# Patient Record
Sex: FEMALE | Race: BLACK OR AFRICAN AMERICAN | NOT HISPANIC OR LATINO | Employment: OTHER | URBAN - METROPOLITAN AREA
[De-identification: names, ages, dates, MRNs, and addresses within clinical notes are randomized per-mention and may not be internally consistent; named-entity substitution may affect disease eponyms.]

---

## 2020-10-29 ENCOUNTER — RECORDS - HEALTHEAST (OUTPATIENT)
Dept: LAB | Facility: CLINIC | Age: 80
End: 2020-10-29

## 2020-10-30 LAB
ALBUMIN SERPL-MCNC: 3.6 G/DL (ref 3.5–5)
ALP SERPL-CCNC: 74 U/L (ref 45–120)
ALT SERPL W P-5'-P-CCNC: 11 U/L (ref 0–45)
ANION GAP SERPL CALCULATED.3IONS-SCNC: 10 MMOL/L (ref 5–18)
AST SERPL W P-5'-P-CCNC: 21 U/L (ref 0–40)
BILIRUB SERPL-MCNC: 0.4 MG/DL (ref 0–1)
BUN SERPL-MCNC: 9 MG/DL (ref 8–28)
CALCIUM SERPL-MCNC: 9.7 MG/DL (ref 8.5–10.5)
CHLORIDE BLD-SCNC: 108 MMOL/L (ref 98–107)
CO2 SERPL-SCNC: 24 MMOL/L (ref 22–31)
CREAT SERPL-MCNC: 0.87 MG/DL (ref 0.6–1.1)
ERYTHROCYTE [DISTWIDTH] IN BLOOD BY AUTOMATED COUNT: 14.9 % (ref 11–14.5)
GFR SERPL CREATININE-BSD FRML MDRD: >60 ML/MIN/1.73M2
GLUCOSE BLD-MCNC: 115 MG/DL (ref 70–125)
HCT VFR BLD AUTO: 45.8 % (ref 35–47)
HGB BLD-MCNC: 13.9 G/DL (ref 12–16)
MCH RBC QN AUTO: 26 PG (ref 27–34)
MCHC RBC AUTO-ENTMCNC: 30.3 G/DL (ref 32–36)
MCV RBC AUTO: 86 FL (ref 80–100)
PLATELET # BLD AUTO: 262 THOU/UL (ref 140–440)
PMV BLD AUTO: 13.3 FL (ref 8.5–12.5)
POTASSIUM BLD-SCNC: 4 MMOL/L (ref 3.5–5)
PROT SERPL-MCNC: 7.9 G/DL (ref 6–8)
RBC # BLD AUTO: 5.35 MILL/UL (ref 3.8–5.4)
SODIUM SERPL-SCNC: 142 MMOL/L (ref 136–145)
TSH SERPL DL<=0.005 MIU/L-ACNC: 3.41 UIU/ML (ref 0.3–5)
VIT B12 SERPL-MCNC: 441 PG/ML (ref 213–816)
WBC: 5.7 THOU/UL (ref 4–11)

## 2020-11-02 LAB — 25(OH)D3 SERPL-MCNC: 37.9 NG/ML (ref 30–80)

## 2021-04-05 ENCOUNTER — RECORDS - HEALTHEAST (OUTPATIENT)
Dept: LAB | Facility: CLINIC | Age: 81
End: 2021-04-05

## 2021-04-06 LAB
ANION GAP SERPL CALCULATED.3IONS-SCNC: 8 MMOL/L (ref 5–18)
BUN SERPL-MCNC: 13 MG/DL (ref 8–28)
CALCIUM SERPL-MCNC: 9.4 MG/DL (ref 8.5–10.5)
CHLORIDE BLD-SCNC: 106 MMOL/L (ref 98–107)
CO2 SERPL-SCNC: 26 MMOL/L (ref 22–31)
CREAT SERPL-MCNC: 0.8 MG/DL (ref 0.6–1.1)
GFR SERPL CREATININE-BSD FRML MDRD: >60 ML/MIN/1.73M2
GLUCOSE BLD-MCNC: 64 MG/DL (ref 70–125)
POTASSIUM BLD-SCNC: 3.9 MMOL/L (ref 3.5–5)
SODIUM SERPL-SCNC: 140 MMOL/L (ref 136–145)

## 2021-04-07 LAB — 25(OH)D3 SERPL-MCNC: 43.8 NG/ML (ref 30–80)

## 2021-07-13 ENCOUNTER — LAB REQUISITION (OUTPATIENT)
Dept: LAB | Facility: CLINIC | Age: 81
End: 2021-07-13
Payer: MEDICARE

## 2021-07-13 DIAGNOSIS — M25.469 EFFUSION, UNSPECIFIED KNEE: ICD-10-CM

## 2021-07-14 ENCOUNTER — LAB REQUISITION (OUTPATIENT)
Dept: LAB | Facility: CLINIC | Age: 81
End: 2021-07-14
Payer: MEDICARE

## 2021-07-14 DIAGNOSIS — Z11.1 ENCOUNTER FOR SCREENING FOR RESPIRATORY TUBERCULOSIS: ICD-10-CM

## 2021-07-14 LAB
BASOPHILS # BLD AUTO: 0 10E3/UL (ref 0–0.2)
BASOPHILS NFR BLD AUTO: 0 %
EOSINOPHIL # BLD AUTO: 0.2 10E3/UL (ref 0–0.7)
EOSINOPHIL NFR BLD AUTO: 3 %
ERYTHROCYTE [DISTWIDTH] IN BLOOD BY AUTOMATED COUNT: 14.4 % (ref 10–15)
HCT VFR BLD AUTO: 42.5 % (ref 35–47)
HGB BLD-MCNC: 13.3 G/DL (ref 11.7–15.7)
IMM GRANULOCYTES # BLD: 0 10E3/UL
IMM GRANULOCYTES NFR BLD: 0 %
LYMPHOCYTES # BLD AUTO: 2.1 10E3/UL (ref 0.8–5.3)
LYMPHOCYTES NFR BLD AUTO: 39 %
MCH RBC QN AUTO: 27.7 PG (ref 26.5–33)
MCHC RBC AUTO-ENTMCNC: 31.3 G/DL (ref 31.5–36.5)
MCV RBC AUTO: 89 FL (ref 78–100)
MONOCYTES # BLD AUTO: 0.4 10E3/UL (ref 0–1.3)
MONOCYTES NFR BLD AUTO: 8 %
NEUTROPHILS # BLD AUTO: 2.7 10E3/UL (ref 1.6–8.3)
NEUTROPHILS NFR BLD AUTO: 50 %
NRBC # BLD AUTO: 0 10E3/UL
NRBC BLD AUTO-RTO: 0 /100
PLATELET # BLD AUTO: 256 10E3/UL (ref 150–450)
RBC # BLD AUTO: 4.8 10E6/UL (ref 3.8–5.2)
URATE SERPL-MCNC: 4.6 MG/DL (ref 2–7.5)
WBC # BLD AUTO: 5.4 10E3/UL (ref 4–11)

## 2021-07-14 PROCEDURE — 36415 COLL VENOUS BLD VENIPUNCTURE: CPT | Mod: ORL | Performed by: FAMILY MEDICINE

## 2021-07-14 PROCEDURE — P9603 ONE-WAY ALLOW PRORATED MILES: HCPCS | Mod: ORL | Performed by: FAMILY MEDICINE

## 2021-07-14 PROCEDURE — 85025 COMPLETE CBC W/AUTO DIFF WBC: CPT | Mod: ORL | Performed by: FAMILY MEDICINE

## 2021-07-14 PROCEDURE — 84550 ASSAY OF BLOOD/URIC ACID: CPT | Mod: ORL | Performed by: FAMILY MEDICINE

## 2021-07-15 PROCEDURE — 36415 COLL VENOUS BLD VENIPUNCTURE: CPT | Mod: ORL | Performed by: FAMILY MEDICINE

## 2021-07-15 PROCEDURE — 86481 TB AG RESPONSE T-CELL SUSP: CPT | Mod: ORL | Performed by: FAMILY MEDICINE

## 2021-07-15 PROCEDURE — P9603 ONE-WAY ALLOW PRORATED MILES: HCPCS | Mod: ORL | Performed by: FAMILY MEDICINE

## 2021-07-16 LAB
GAMMA INTERFERON BACKGROUND BLD IA-ACNC: 0.06 IU/ML
M TB IFN-G BLD-IMP: NEGATIVE
M TB IFN-G CD4+ BCKGRND COR BLD-ACNC: 9.94 IU/ML
MITOGEN IGNF BCKGRD COR BLD-ACNC: 0.01 IU/ML
MITOGEN IGNF BCKGRD COR BLD-ACNC: 0.02 IU/ML
QUANTIFERON MITOGEN: 10 IU/ML
QUANTIFERON NIL TUBE: 0.06 IU/ML
QUANTIFERON TB1 TUBE: 0.08 IU/ML
QUANTIFERON TB2 TUBE: 0.07

## 2023-01-09 ENCOUNTER — OFFICE VISIT (OUTPATIENT)
Dept: URGENT CARE | Facility: URGENT CARE | Age: 83
End: 2023-01-09
Payer: MEDICARE

## 2023-01-09 ENCOUNTER — ANCILLARY PROCEDURE (OUTPATIENT)
Dept: GENERAL RADIOLOGY | Facility: CLINIC | Age: 83
End: 2023-01-09
Attending: PHYSICIAN ASSISTANT
Payer: MEDICARE

## 2023-01-09 VITALS
TEMPERATURE: 97.9 F | SYSTOLIC BLOOD PRESSURE: 181 MMHG | OXYGEN SATURATION: 96 % | HEART RATE: 53 BPM | DIASTOLIC BLOOD PRESSURE: 80 MMHG

## 2023-01-09 DIAGNOSIS — M25.572 PAIN IN JOINT INVOLVING ANKLE AND FOOT, LEFT: Primary | ICD-10-CM

## 2023-01-09 DIAGNOSIS — M25.572 PAIN IN JOINT INVOLVING ANKLE AND FOOT, LEFT: ICD-10-CM

## 2023-01-09 PROCEDURE — 36415 COLL VENOUS BLD VENIPUNCTURE: CPT | Performed by: PHYSICIAN ASSISTANT

## 2023-01-09 PROCEDURE — 73610 X-RAY EXAM OF ANKLE: CPT | Mod: TC | Performed by: RADIOLOGY

## 2023-01-09 PROCEDURE — 99203 OFFICE O/P NEW LOW 30 MIN: CPT | Performed by: PHYSICIAN ASSISTANT

## 2023-01-09 PROCEDURE — 84550 ASSAY OF BLOOD/URIC ACID: CPT | Performed by: PHYSICIAN ASSISTANT

## 2023-01-09 RX ORDER — INDOMETHACIN 25 MG/1
25 CAPSULE ORAL 2 TIMES DAILY WITH MEALS
Qty: 30 CAPSULE | Refills: 0 | Status: SHIPPED | OUTPATIENT
Start: 2023-01-09

## 2023-01-09 RX ORDER — AMLODIPINE BESYLATE 5 MG/1
1 TABLET ORAL
COMMUNITY
Start: 2022-12-22

## 2023-01-09 RX ORDER — PSEUDOEPHED/ACETAMINOPH/DIPHEN 30MG-500MG
TABLET ORAL
COMMUNITY
Start: 2022-03-03

## 2023-01-09 RX ORDER — QUETIAPINE FUMARATE 25 MG/1
12.5 TABLET, FILM COATED ORAL
COMMUNITY
Start: 2022-12-22

## 2023-01-09 RX ORDER — LISINOPRIL 20 MG/1
TABLET ORAL
COMMUNITY
Start: 2022-04-12

## 2023-01-09 RX ORDER — POLYETHYLENE GLYCOL 3350 17 G/17G
POWDER, FOR SOLUTION ORAL
COMMUNITY
Start: 2022-01-31

## 2023-01-09 RX ORDER — FUROSEMIDE 20 MG
TABLET ORAL
COMMUNITY
Start: 2022-11-07

## 2023-01-09 RX ORDER — LIDOCAINE 50 MG/G
PATCH TOPICAL
COMMUNITY
Start: 2022-03-09

## 2023-01-09 NOTE — LETTER
[facility] URGENT [facility]  [address] [facility] [facility] [address]  Phone: [phone]    January 9, 2023        Terrell Arroyo  [address] [address]          To whom it may concern:    RE: Terrell Arroyo    Patient was seen and treated today at our clinic. She is able to walk and stand transfer but should avoid being on her feet for any extended period of time.    Please contact me for questions or concerns.      Sincerely,        Mariya Bolton PA-C

## 2023-01-09 NOTE — LETTER
January 12, 2023      Terrell Arjun Cruz  [address] [facility] [address]        Dear ,    We are writing to inform you of your test results.    Your test results fall within the expected range(s). Your uric acid for gout was normal.    Enclosed is a copy of these results.    Resulted Orders   Uric acid   Result Value Ref Range    Uric Acid 5.1 2.6 - 6.0 mg/dL       If you have any questions or concerns, please call the clinic at the number listed above.       Sincerely,      Mariya Bolton PA-C

## 2023-01-10 LAB — URATE SERPL-MCNC: 5.1 MG/DL (ref 2.6–6)

## 2023-01-10 NOTE — PROGRESS NOTES
Assessment & Plan     Pain in joint involving ankle and foot, left  - Uric acid; Future  - XR Ankle Left G/E 3 Views; Future  - Uric acid  - indomethacin (INDOCIN) 25 MG capsule; Take 1 capsule (25 mg) by mouth 2 times daily (with meals)  - Orthopedic  Referral; Future    Suspect gout. Will treat with indomethacin. See ortopedics if symptoms worsen or do not improve after 1 week. Elevate, avoid weight bearing as much as able.     Please call daughter Mangolia with uric acid result- [phone]    No follow-ups on file.     Mariya Bolton PA-C  [facility] URGENT CARE CLINICS    Subjective   Terrell Arroyo is a 82 year old who presents for the following health issues     Patient presents with:  Urgent Care  Foot Pain: Left foot pain and swelling for a few day    HPI    Terrell presents to clinic today with her daughter for evaluation of left foot pain.  Her daughter believes symptoms began 3 or 4 days ago.  She suddenly developed significant pain and swelling in her left ankle and is unable to bear weight.  No fevers.  She does not believe that she is ever had gout before.  She does have problems with her sciatic nerve causing pain in her legs.  She has not had an injection in quite some time.    Review of Systems   ROS negative except as stated above.      Objective    BP (!) 181/80 (BP Location: Right arm, Patient Position: Sitting, Cuff Size: Adult Large)   Pulse 53   Temp 97.9  F (36.6  C) (Tympanic)   SpO2 96%   Physical Exam   GENERAL: healthy, alert and no distress  MS: Left ankle swollen with 2+ pitting edema from mid calf to toes.  Range of motion decreased in ankle secondary to swelling.  Minimal excess warmth in ankle.  Point tenderness along medial malleolus.    Results for orders placed or performed in visit on 01/09/23   XR Ankle Left G/E 3 Views     Status: None    Narrative    EXAM: XR ANKLE LEFT G/E 3 VIEWS  LOCATION: [facility]  DATE/TIME:  1/9/2023 7:02 PM    INDICATION: left medial ankle pain, swelling, warmth, no injury  COMPARISON: None.      Impression    IMPRESSION: Normal joint spaces and alignment. No fracture. Soft tissue swelling along the lateral aspect of the ankle.

## 2023-01-11 ENCOUNTER — ANCILLARY PROCEDURE (OUTPATIENT)
Dept: GENERAL RADIOLOGY | Facility: CLINIC | Age: 83
End: 2023-01-11
Attending: PODIATRIST
Payer: MEDICARE

## 2023-01-11 ENCOUNTER — OFFICE VISIT (OUTPATIENT)
Dept: PODIATRY | Facility: CLINIC | Age: 83
End: 2023-01-11
Payer: MEDICARE

## 2023-01-11 VITALS — OXYGEN SATURATION: 97 % | DIASTOLIC BLOOD PRESSURE: 79 MMHG | SYSTOLIC BLOOD PRESSURE: 154 MMHG | HEART RATE: 87 BPM

## 2023-01-11 DIAGNOSIS — M25.572 PAIN IN JOINT INVOLVING ANKLE AND FOOT, LEFT: ICD-10-CM

## 2023-01-11 DIAGNOSIS — Q66.6 PES VALGUS OF LEFT FOOT: Primary | ICD-10-CM

## 2023-01-11 DIAGNOSIS — M79.672 LEFT FOOT PAIN: ICD-10-CM

## 2023-01-11 DIAGNOSIS — M20.12 HALLUX VALGUS (ACQUIRED), LEFT FOOT: ICD-10-CM

## 2023-01-11 PROCEDURE — 99204 OFFICE O/P NEW MOD 45 MIN: CPT | Performed by: PODIATRIST

## 2023-01-11 PROCEDURE — 73620 X-RAY EXAM OF FOOT: CPT | Mod: TC | Performed by: RADIOLOGY

## 2023-01-11 PROCEDURE — 73600 X-RAY EXAM OF ANKLE: CPT | Mod: TC | Performed by: RADIOLOGY

## 2023-01-11 NOTE — LETTER
1/11/2023         RE: Terrell Arroyo  [address]  [facility] [address]        Dear Colleague,    Thank you for referring your patient, Terrell Arroyo, to the [facility]. Please see a copy of my visit note below.    Assessment:      ICD-10-CM    1. Pes valgus of left foot  Q66.6 [facility] DME Orthotic; Foot Orthotics     Orthotics and Prosthetics DME Orthotic; AFO (Ankle Foot Orthosis)     Physical Therapy Referral      2. Hallux valgus (acquired), left foot  M20.12 Orthotics and Prosthetics DME Orthotic; Foot Orthotics     Orthotics and Prosthetics DME Orthotic; AFO (Ankle Foot Orthosis)     Physical Therapy Referral           Plan:  Orders Placed This Encounter   Procedures     XR Foot Left 2 Views     XR Ankle Left 2 Views     Physical Therapy Referral     Orthotics and Prosthetics DME Orthotic; Foot Orthotics     Orthotics and Prosthetics DME Orthotic; AFO (Ankle Foot Orthosis)       Discussed the etiology and treatment of the condition with the patient.  Imaging studies reviewed and discussed with the patient.  Discussed surgical and conservative options.    Painful flatfoot    -NSAID- topical  -Orthoses-  Custom Rx today  -Brace- custom Rx today  -PT- referral  -Activity- to tolerance    Conservative care due to age / significant recovery of surgical RF fusion discussed      Return:  No follow-ups on file.    Mera Hernandez DPM                Chief Complaint:     Patient presents with:  Left Ankle - Pain  Left Foot - Pain     left foot pain    HPI:  Terrell Arroyo is a 82 year old year old female who presents for evaluation of foot pain.    Pain location- PT tendon insertion / spring ligament medial ankle  Duration- ongoing since 1/6/2022  No ASHLEY according to daughter         Past Medical & Surgical History:  No past medical history on file.   No past surgical history on file.   No family history on file.     Social History:  ?  History    Smoking Status     Never   Smokeless Tobacco     Never     History   Drug Use Not on file     Social History    Substance and Sexual Activity      Alcohol use: Never      Allergies:  ?   Allergies   Allergen Reactions     Penicillins         Medications:    Current Outpatient Medications   Medication     ACETAMINOPHEN EXTRA STRENGTH 500 MG tablet     amLODIPine (NORVASC) 5 MG tablet     calcium carbonate (OS-CHINMAY) 1500 (600 Ca) MG tablet     diclofenac (VOLTAREN) 1 % topical gel     furosemide (LASIX) 20 MG tablet     indomethacin (INDOCIN) 25 MG capsule     lidocaine (LIDODERM) 5 % patch     lisinopril (ZESTRIL) 20 MG tablet     polyethylene glycol (MIRALAX) 17 GM/Dose powder     QUEtiapine (SEROQUEL) 25 MG tablet     No current facility-administered medications for this visit.       Physical Exam:  ?  Vitals:  BP (!) 154/79 (BP Location: Left arm)   Pulse 87   SpO2 97%    General:  WD/WN, in NAD.  A&O x3.  Dermatologic:    Skin is intact, open lesions absent.   Skin texture, turgor is normal.  Vascular:  Digital capillary refill time normal bilateral.  Skin temperature is normal bilateral.  Generalized edema- trace bilateral.  Focal edema- moderate medial ankle left.  Neurologic:    Gross sensation normal.  Gait and balance normal.  Musculoskeletal:  Maximal pain to palpation of PT tendon insertion / spring ligament left.  no pain to palpation of PT tendon more proximally, sinus tarsi, left.  Ankle, STJ, MTJ ROM full, pain free left.    Muscle strength 5/5  foot and ankle bilateral.    Stance:  RCSP Valgus bilateral.  Foot type:  Severe valgus  Clinical deformity: hallux valgus    Imaging:   x-ray independently reviewed and interpreted by myself today.  Weight-bearing views left foot dated 01/11/23, reveal severe pes valgus w/ calc inclination ~5 deg, ankle joint intact.  Osteopenia.  Signifcant hallux valgus.                      Again, thank you for allowing me to participate in the care of your patient.         Sincerely,        Mera Hernandez DPM

## 2023-01-11 NOTE — PATIENT INSTRUCTIONS
PATIENT INSTRUCTIONS - Podiatry / Foot & Ankle Surgery      Physical Therapy  You have been referred to [facility] Physical Therapy.  Locations can be found online.  Please call to schedule an appointment:  [phone]        [facility]  [facility]  [address]  [place] [address]  Phone: [phone]  Fax: [phone] [facility]  [address]  [place] [address]  Phone: [phone]   Fax: [phone]   [facility]  [address]  [place] [address]  Phone: [phone]  Fax: [phone] [facility]  [address]  [place] [address]  Phone: [phone]   Fax: [phone]   [facility]   [address]  [place] [address]  Phone: [phone]  Fax: [phone] * Please call any location listed to make an appointment for a casting/fitting. Your referral was sent to their central office and they will all have the order on file.         Diclofenac / Voltaren Gel- Apply to affected area only.  Apply 3-4 times daily for the first 3-4 days, then 1-2 times daily as needed.  Over the counter (OTC), a prescription is not needed.  Available at most pharmacies, [employer], [employer].

## 2023-01-11 NOTE — PROGRESS NOTES
Assessment:      ICD-10-CM    1. Pes valgus of left foot  Q66.6 Orthotics and Prosthetics DME Orthotic; Foot Orthotics     Orthotics and Prosthetics DME Orthotic; AFO (Ankle Foot Orthosis)     Physical Therapy Referral      2. Hallux valgus (acquired), left foot  M20.12 Orthotics and Prosthetics DME Orthotic; Foot Orthotics     Orthotics and Prosthetics DME Orthotic; AFO (Ankle Foot Orthosis)     Physical Therapy Referral           Plan:  Orders Placed This Encounter   Procedures     XR Foot Left 2 Views     XR Ankle Left 2 Views     Physical Therapy Referral     Orthotics and Prosthetics DME Orthotic; Foot Orthotics     Orthotics and Prosthetics DME Orthotic; AFO (Ankle Foot Orthosis)       Discussed the etiology and treatment of the condition with the patient.  Imaging studies reviewed and discussed with the patient.  Discussed surgical and conservative options.    Painful flatfoot    -NSAID- topical  -Orthoses-  Custom Rx today  -Brace- custom Rx today  -PT- referral  -Activity- to tolerance    Conservative care due to age / significant recovery of surgical RF fusion discussed      Return:  No follow-ups on file.    Mera Hernandez DPM                Chief Complaint:     Patient presents with:  Left Ankle - Pain  Left Foot - Pain     left foot pain    HPI:  Terrell Arroyo is a 82 year old year old female who presents for evaluation of foot pain.    Pain location- PT tendon insertion / spring ligament medial ankle  Duration- ongoing since 1/6/2022  No ASHLEY according to daughter         Past Medical & Surgical History:  No past medical history on file.   No past surgical history on file.   No family history on file.     Social History:  ?  History   Smoking Status     Never   Smokeless Tobacco     Never     History   Drug Use Not on file     Social History    Substance and Sexual Activity      Alcohol use: Never      Allergies:  ?   Allergies   Allergen Reactions     Penicillins         Medications:     Current Outpatient Medications   Medication     ACETAMINOPHEN EXTRA STRENGTH 500 MG tablet     amLODIPine (NORVASC) 5 MG tablet     calcium carbonate (OS-CHINMAY) 1500 (600 Ca) MG tablet     diclofenac (VOLTAREN) 1 % topical gel     furosemide (LASIX) 20 MG tablet     indomethacin (INDOCIN) 25 MG capsule     lidocaine (LIDODERM) 5 % patch     lisinopril (ZESTRIL) 20 MG tablet     polyethylene glycol (MIRALAX) 17 GM/Dose powder     QUEtiapine (SEROQUEL) 25 MG tablet     No current facility-administered medications for this visit.       Physical Exam:  ?  Vitals:  BP (!) 154/79 (BP Location: Left arm)   Pulse 87   SpO2 97%    General:  WD/WN, in NAD.  A&O x3.  Dermatologic:    Skin is intact, open lesions absent.   Skin texture, turgor is normal.  Vascular:  Digital capillary refill time normal bilateral.  Skin temperature is normal bilateral.  Generalized edema- trace bilateral.  Focal edema- moderate medial ankle left.  Neurologic:    Gross sensation normal.  Gait and balance normal.  Musculoskeletal:  Maximal pain to palpation of PT tendon insertion / spring ligament left.  no pain to palpation of PT tendon more proximally, sinus tarsi, left.  Ankle, STJ, MTJ ROM full, pain free left.    Muscle strength 5/5  foot and ankle bilateral.    Stance:  RCSP Valgus bilateral.  Foot type:  Severe valgus  Clinical deformity: hallux valgus    Imaging:   x-ray independently reviewed and interpreted by myself today.  Weight-bearing views left foot dated 01/11/23, reveal severe pes valgus w/ calc inclination ~5 deg, ankle joint intact.  Osteopenia.  Signifcant hallux valgus.